# Patient Record
(demographics unavailable — no encounter records)

---

## 2024-10-15 NOTE — PHYSICAL EXAM
[Left] : left knee [All Views] : anteroposterior, lateral, skyline, and anteroposterior standing [5___] : hamstring 5[unfilled]/5 [] : no calf tenderness [de-identified] : Decreased sensation stocking distribution to the ankle [FreeTextEntry9] : Medial joint space narrowing. Sclerosis of the medial knee. Varus deformity. Severe DJD medial compartment. Tricompartmental osteophyte formation. No fractures seen.      [TWNoteComboBox7] : flexion 95 degrees [de-identified] : extension 10 degrees

## 2024-10-15 NOTE — HISTORY OF PRESENT ILLNESS
[de-identified] : Date of Injury/Onset: YEARS HAD RIGHT TKA 1/2024 AND IS VERY HAPPY WITH RESULTS.  Mechanism of injury: NKI Have you been treated for this in the past?N Have you had surgery for this in the past?N Physical Therapy/ HEP:  PT WITH RIGHT TKA 1/2024 AND GOES TO GYM REGULARLY OTC Medicines: TYLENOL FOR PAIN,  NO NSAIDS D/T HBP RX medicines:NO Heat, Ice, Elevation:  ICE CSI or Gel Injections: (Indicate which) None Other Previous Treatment /Imaging/Studies Since Last Visit:  Pain: At Rest:5 /10 With Activity:7 /10 Quality of symptoms: C/O POSTERIOR KNEE PAIN, SWELLING, GRINDING .    Affecting Sleep: Yes Stiffness upon waking, lasting greater than 30mins: Yes Difficulty with stairs: Yes Difficulty getting in and out of car: Yes Sit to stand stiffness: Yes Affects walking short/long distances? Yes Home/Work/Recreation affected? Yes  Improves with: KNEE SLEEVE SOME HELP Worse with: Any activity   This is not a Work-Related Injury being treated under Worker's Compensation. This is not an athletic injury occurring associated with an interscholastic or organized sports team.     Additional Information:

## 2024-10-15 NOTE — DISCUSSION/SUMMARY
[de-identified] : The patient was advised of the diagnosis. The natural history of the pathology was explained in full to the patient in layman's terms. Several different treatment options were discussed and explained in full to the patient including the risks and benefits of both surgical and non-surgical treatments. All questions and concerns were answered.  Lengthy discussion regarding options was had with the patient. Nonsurgical options including but not limited to cortisone, Visco supplementation, Prescription anti-inflammatory medications (both steroidal and non-steroidal), activity modification, non-impact exercise, maintaining a healthy BMI, bracing, and icing were reviewed. Surgical options including but not limited to arthroscopy, and joint replacement were discussed as was risks, benefits and alternatives of all the proposed treatments. Discussed also with the patient that they could also delay any immediate treatment options, and continue to observe and self care for the discussed problem. Discussed HEP as well as Rest, Ice and elevation. Patient had ample time to ask any questions about todays visit and the diagnosis, and all questions were answered. Patient understands the plan going forward.  Patient was provided a prescription for Meloxicam 15mg today. Patient was instructed to take one as needed for pain. Patient was advised on medication risks associated with anti inflammatory medications.  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  I spent time going in detail the problem and the associated risks/benefits of surgery. Went into detailed discussion of complications including but not limited to, nerve injury, non-union, repeat fracture, DVT /PE /pe postop, instability, transfusion, infection, NVA injury, stiffness, leg length discrepancy, inability to ambulate & death. Discussed implant and model shown to patient. Patient had ample time to ask any questions, and at this time answered all patient questions, should anymore arise they were instructed to follow up. Patient expressed understanding of the proposed procedure and postop directions.   The plan at this time is to move forward with a LEFT KNEE TOTAL ARTHROPLASTY AT [facility].

## 2025-03-21 NOTE — DISCUSSION/SUMMARY
[de-identified] : Patient doing well overall. Dressing removed in office today; incision C/D/I.  Patient can begin formal PT. Continue using walker for ambulation at this time. Continue taking ASA to prevent blood clots. Continue using compression stockings to prevent swelling. Discussed importance of non-impact exercise and muscle stretching before and after exercise. Explained the importance of ice and rest.  Pt is doing well and is to continue with treatment plan. Pt was shown exercises and stretches that can help increase  ROM and strength.  Patient will begin OPPT at this time, given Rx for this.  F/u in 4 weeks

## 2025-03-21 NOTE — PHYSICAL EXAM
[Left] : left knee [5___] : hamstring 5[unfilled]/5 [] : no calf tenderness [FreeTextEntry3] : Gallo Removed C/D/I

## 2025-03-21 NOTE — HISTORY OF PRESENT ILLNESS
[de-identified] : LT TKA 3/5/25.  Patient denies fevers, Chills, SOB. Patient has been taking asa BID as directed. Patient has been wearing stockings as directed. Patient reports that PT has been coming to the house. Patient reports use of oxy and tylenol for pain at this time. Ambulating without support

## 2025-04-10 NOTE — LETTER BODY
[Dear  ___] : Dear  [unfilled], [Consult Letter:] : I had the pleasure of evaluating your patient, [unfilled]. [Please see my note below.] : Please see my note below. [Consult Closing:] : Thank you very much for allowing me to participate in the care of this patient.  If you have any questions, please do not hesitate to contact me. [Sincerely,] : Sincerely, [FreeTextEntry3] : Payam Ruvalcaba, DO Genitourinary Medicine

## 2025-04-10 NOTE — HISTORY OF PRESENT ILLNESS
[FreeTextEntry1] : Mr. FRANKY SANDOVAL 63 year old  M  PMH HTN and PSH b/l knee. Pt comes in bc 3/22 pt had a UTI and sepsis. Pt has urgency, frequent, nocturia. Feeling of incomplete bladder emptying.   3/27/25 PSA 20.30  3/28/25 US PVR 87 Prostate size 87

## 2025-04-10 NOTE — PHYSICAL EXAM
[Normal Appearance] : normal appearance [Well Groomed] : well groomed [General Appearance - In No Acute Distress] : no acute distress [Edema] : no peripheral edema [] : no respiratory distress [Respiration, Rhythm And Depth] : normal respiratory rhythm and effort [Exaggerated Use Of Accessory Muscles For Inspiration] : no accessory muscle use [Abdomen Soft] : soft [Abdomen Tenderness] : non-tender [Costovertebral Angle Tenderness] : no ~M costovertebral angle tenderness [Urinary Bladder Findings] : the bladder was normal on palpation [Normal Station and Gait] : the gait and station were normal for the patient's age [No Focal Deficits] : no focal deficits [Oriented To Time, Place, And Person] : oriented to person, place, and time [Affect] : the affect was normal [Mood] : the mood was normal

## 2025-04-10 NOTE — ASSESSMENT
[FreeTextEntry1] : Plan UA culture start levaquin start flomax repeat PSA after tx of prostatitis fu 2 weeks

## 2025-04-24 NOTE — DISCUSSION/SUMMARY
[de-identified] : Discussed importance of non-impact exercise and muscle stretching before and after exercise. Reviewed x-rays Explained the importance of ice and rest. Stretching exercises shown, Explained the importance of Range of Motion before strength.    At this time after discussion of the options, the patient would benefit from CONTINUED organized Physical Therapy to continue to increase overall functionality. The patient was provided with an updated Rx in office today and was instructed to attend PT for 6-8 weeks in order to increase strength and ROM. Patient agreed with this plan and will continue PT at this time.  DISCUSSED NEED FOR PROPHYLACTIC ABX PRIOR TO ALL DENTAL WORK AND CLEANINGS FOR LIFE   Continue home strengthening and stretching program consisting of non-impact exercises and ice as needed. Return to office 6 weeks

## 2025-04-24 NOTE — PHYSICAL EXAM
[NL (0)] : extension 0 degrees [5___] : hamstring 5[unfilled]/5 [] : patient ambulates without assistive device [Left] : left knee [AP] : anteroposterior [Lateral] : lateral [[place]] : skyline [FreeTextEntry3] : Small Stitch abcess Mid Incision, Removed, Bacitracin and Bandaid applied.  [FreeTextEntry9] : Well-aligned, well-fixed prosthesis. No lucency noted. Patella centered.  [TWNoteComboBox7] : flexion 120 degrees

## 2025-04-24 NOTE — REASON FOR VISIT
Nrsg note: Pt discharged. Voiced an understanding of the instructions.   [FreeTextEntry2] : here for f/u left TKA 3/5/25.  states that he had postop UTI  and sepsis 3/24/25.  was in [facility].  states he has been f/u with Urology and is good now.  still going to OPPT, using tylenol prn and using ice

## 2025-05-05 NOTE — HISTORY OF PRESENT ILLNESS
[FreeTextEntry1] :  Pt comes infollow up UTI and sepsis. Pt has urgency, frequent, nocturia. Feeling of incomplete bladder emptying.  Pt currently feeling much better and happy with his urination.  3/27/25 PSA 20.30 5/1/25 PSA 3.44  3/28/25 US PVR 87 Prostate size 87

## 2025-05-22 NOTE — PHYSICAL EXAM
[5___] : hamstring 5[unfilled]/5 [] : patient ambulates without assistive device [Left] : left knee [AP] : anteroposterior [Lateral] : lateral [[place]] : skyline [FreeTextEntry9] : Well-aligned, well-fixed prosthesis. No lucency noted. Patella centered.  [TWNoteComboBox7] : flexion 110 degrees [de-identified] : extension 3 degrees

## 2025-05-22 NOTE — DISCUSSION/SUMMARY
[de-identified] : Patient continues to progress well at this time. Patient demonstrates continued improvement and maintenance of both strength and range of motion. Patient expresses that they are feeling very well at this time, and reports being very pleased with the outcome at this time. Reviewed x-rays with patient, demonstrating well fixed components. Discussed importance of non-impact exercise and muscle stretching before and after exercise. Stretching exercises shown and demonstrated in office for patient to continue with daily. Explained the importance of ice and rest. Continue home strengthening and stretching program consisting of non-impact exercises and ice as needed.     Patient was instructed at this time that they are to follow up every 9 months for continued evaluation, or sooner if any problems or concerns should arise. Patient expressed understanding of this.

## 2025-05-22 NOTE — HISTORY OF PRESENT ILLNESS
Message   Recorded as Task   Date: 08/02/2018 11:31 AM, Created By: LeolaAntonio   Task Name: 1. Rx Request   Assigned To: SHARONA QUARLES   Regarding Patient: JIMMY RODRIGUEZ, Status: Active   Comment:    LeolaJan - [address] [facility] [address] [facility] [address]. Patients wife Dayami can be reached at #[phone]   Aida Hodges - CST     [de-identified] : 5/22/25  here for f/u left TKA 3/5/25.  doing well, still going to OPPT.  no pain meds at this time  4/24/25 here for f/u left TKA 3/5/25.  states that he had postop UTI  and sepsis 3/24/25.  was in [facility].  states he has been f/u with Urology and is good now.  still going to OPPT, using tylenol prn and using ice

## 2025-07-28 NOTE — HISTORY OF PRESENT ILLNESS
[FreeTextEntry1] :  Pt comes infollow up UTI and sepsis. Pt has urgency, frequent, nocturia. Feeling of incomplete bladder emptying.  Pt currently feeling much better and happy with his urination.  3/27/25 PSA 20.30 5/1/25 PSA 3.44 7/22/25 PSA 3.4  3/28/25 US PVR 87 Prostate size 87